# Patient Record
Sex: FEMALE | URBAN - METROPOLITAN AREA
[De-identification: names, ages, dates, MRNs, and addresses within clinical notes are randomized per-mention and may not be internally consistent; named-entity substitution may affect disease eponyms.]

---

## 2022-11-18 ENCOUNTER — TELEPHONE ENCOUNTER (OUTPATIENT)
Dept: URBAN - METROPOLITAN AREA CLINIC 92 | Facility: CLINIC | Age: 52
End: 2022-11-18

## 2022-11-18 ENCOUNTER — DASHBOARD ENCOUNTERS (OUTPATIENT)
Age: 52
End: 2022-11-18

## 2022-11-18 ENCOUNTER — OFFICE VISIT (OUTPATIENT)
Dept: URBAN - METROPOLITAN AREA TELEHEALTH 2 | Facility: TELEHEALTH | Age: 52
End: 2022-11-18
Payer: COMMERCIAL

## 2022-11-18 VITALS — WEIGHT: 90 LBS | HEIGHT: 62 IN | BODY MASS INDEX: 16.56 KG/M2

## 2022-11-18 DIAGNOSIS — Z12.11 SCREENING FOR COLON CANCER: ICD-10-CM

## 2022-11-18 PROCEDURE — 99203 OFFICE O/P NEW LOW 30 MIN: CPT | Performed by: PHYSICIAN ASSISTANT

## 2022-11-18 NOTE — HPI-OTHER HISTORIES
The patient presents for a colon cancer screening. There is no family history of colon polyps or colon cancer. Patient denies change in bowel habits, appetite, and weight. Patient denies rectal bleeding. Last colonoscopy: never. Patient denies any upper or lower GI symptoms. Patient denies any heart, lung, or kidney problems.

## 2022-12-28 ENCOUNTER — TELEPHONE ENCOUNTER (OUTPATIENT)
Dept: URBAN - METROPOLITAN AREA CLINIC 128 | Facility: CLINIC | Age: 52
End: 2022-12-28

## 2022-12-30 ENCOUNTER — CLAIMS CREATED FROM THE CLAIM WINDOW (OUTPATIENT)
Dept: URBAN - METROPOLITAN AREA CLINIC 4 | Facility: CLINIC | Age: 52
End: 2022-12-30
Payer: COMMERCIAL

## 2022-12-30 ENCOUNTER — CLAIMS CREATED FROM THE CLAIM WINDOW (OUTPATIENT)
Dept: URBAN - METROPOLITAN AREA SURGERY CENTER 31 | Facility: SURGERY CENTER | Age: 52
End: 2022-12-30
Payer: COMMERCIAL

## 2022-12-30 ENCOUNTER — OFFICE VISIT (OUTPATIENT)
Dept: URBAN - METROPOLITAN AREA SURGERY CENTER 31 | Facility: SURGERY CENTER | Age: 52
End: 2022-12-30

## 2022-12-30 DIAGNOSIS — D12.2 BENIGN NEOPLASM OF ASCENDING COLON: ICD-10-CM

## 2022-12-30 DIAGNOSIS — Z12.11 COLON CANCER SCREENING: ICD-10-CM

## 2022-12-30 DIAGNOSIS — D12.2 ADENOMA OF ASCENDING COLON: ICD-10-CM

## 2022-12-30 PROCEDURE — 88305 TISSUE EXAM BY PATHOLOGIST: CPT | Performed by: PATHOLOGY

## 2022-12-30 PROCEDURE — G8907 PT DOC NO EVENTS ON DISCHARG: HCPCS | Performed by: STUDENT IN AN ORGANIZED HEALTH CARE EDUCATION/TRAINING PROGRAM

## 2022-12-30 PROCEDURE — 45385 COLONOSCOPY W/LESION REMOVAL: CPT | Performed by: STUDENT IN AN ORGANIZED HEALTH CARE EDUCATION/TRAINING PROGRAM
